# Patient Record
Sex: MALE | Race: WHITE | NOT HISPANIC OR LATINO | Employment: FULL TIME | URBAN - METROPOLITAN AREA
[De-identification: names, ages, dates, MRNs, and addresses within clinical notes are randomized per-mention and may not be internally consistent; named-entity substitution may affect disease eponyms.]

---

## 2018-09-21 ENCOUNTER — HOSPITAL ENCOUNTER (EMERGENCY)
Facility: HOSPITAL | Age: 18
Discharge: HOME | End: 2018-09-21
Attending: EMERGENCY MEDICINE | Admitting: EMERGENCY MEDICINE

## 2018-09-21 VITALS
TEMPERATURE: 99.4 F | BODY MASS INDEX: 39.76 KG/M2 | HEART RATE: 80 BPM | RESPIRATION RATE: 16 BRPM | OXYGEN SATURATION: 98 % | DIASTOLIC BLOOD PRESSURE: 70 MMHG | WEIGHT: 284 LBS | SYSTOLIC BLOOD PRESSURE: 128 MMHG | HEIGHT: 71 IN

## 2018-09-21 DIAGNOSIS — H10.31 ACUTE CONJUNCTIVITIS OF RIGHT EYE, UNSPECIFIED ACUTE CONJUNCTIVITIS TYPE: ICD-10-CM

## 2018-09-21 DIAGNOSIS — J06.9 UPPER RESPIRATORY TRACT INFECTION, UNSPECIFIED TYPE: Primary | ICD-10-CM

## 2018-09-21 LAB
ANION GAP SERPL CALCULATED.3IONS-SCNC: 12.3 MMOL/L
B PERT DNA SPEC QL NAA+PROBE: NOT DETECTED
BASOPHILS # BLD AUTO: 0.02 10*3/MM3 (ref 0–0.2)
BASOPHILS NFR BLD AUTO: 0.2 % (ref 0–1.5)
BUN BLD-MCNC: 10 MG/DL (ref 6–20)
BUN/CREAT SERPL: 11.6 (ref 7–25)
C PNEUM DNA NPH QL NAA+NON-PROBE: NOT DETECTED
CALCIUM SPEC-SCNC: 9.2 MG/DL (ref 8.6–10.5)
CHLORIDE SERPL-SCNC: 104 MMOL/L (ref 98–107)
CO2 SERPL-SCNC: 24.7 MMOL/L (ref 22–29)
CREAT BLD-MCNC: 0.86 MG/DL (ref 0.76–1.27)
DEPRECATED RDW RBC AUTO: 44.3 FL (ref 37–54)
EOSINOPHIL # BLD AUTO: 0.09 10*3/MM3 (ref 0–0.7)
EOSINOPHIL NFR BLD AUTO: 0.7 % (ref 0.3–6.2)
ERYTHROCYTE [DISTWIDTH] IN BLOOD BY AUTOMATED COUNT: 13.8 % (ref 11.5–14.5)
FLUAV H1 2009 PAND RNA NPH QL NAA+PROBE: NOT DETECTED
FLUAV H1 HA GENE NPH QL NAA+PROBE: NOT DETECTED
FLUAV H3 RNA NPH QL NAA+PROBE: NOT DETECTED
FLUAV SUBTYP SPEC NAA+PROBE: NOT DETECTED
FLUBV RNA ISLT QL NAA+PROBE: NOT DETECTED
GFR SERPL CREATININE-BSD FRML MDRD: 115 ML/MIN/1.73
GLUCOSE BLD-MCNC: 88 MG/DL (ref 65–99)
HADV DNA SPEC NAA+PROBE: NOT DETECTED
HCOV 229E RNA SPEC QL NAA+PROBE: NOT DETECTED
HCOV HKU1 RNA SPEC QL NAA+PROBE: NOT DETECTED
HCOV NL63 RNA SPEC QL NAA+PROBE: NOT DETECTED
HCOV OC43 RNA SPEC QL NAA+PROBE: NOT DETECTED
HCT VFR BLD AUTO: 42.2 % (ref 40.4–52.2)
HGB BLD-MCNC: 14.1 G/DL (ref 13.7–17.6)
HMPV RNA NPH QL NAA+NON-PROBE: NOT DETECTED
HOLD SPECIMEN: NORMAL
HOLD SPECIMEN: NORMAL
HPIV1 RNA SPEC QL NAA+PROBE: NOT DETECTED
HPIV2 RNA SPEC QL NAA+PROBE: NOT DETECTED
HPIV3 RNA NPH QL NAA+PROBE: NOT DETECTED
HPIV4 P GENE NPH QL NAA+PROBE: NOT DETECTED
IMM GRANULOCYTES # BLD: 0.03 10*3/MM3 (ref 0–0.03)
IMM GRANULOCYTES NFR BLD: 0.2 % (ref 0–0.5)
LYMPHOCYTES # BLD AUTO: 2.44 10*3/MM3 (ref 0.9–4.8)
LYMPHOCYTES NFR BLD AUTO: 19 % (ref 19.6–45.3)
M PNEUMO IGG SER IA-ACNC: NOT DETECTED
MCH RBC QN AUTO: 29.1 PG (ref 27–32.7)
MCHC RBC AUTO-ENTMCNC: 33.4 G/DL (ref 32.6–36.4)
MCV RBC AUTO: 87 FL (ref 79.8–96.2)
MONOCYTES # BLD AUTO: 0.9 10*3/MM3 (ref 0.2–1.2)
MONOCYTES NFR BLD AUTO: 7 % (ref 5–12)
NEUTROPHILS # BLD AUTO: 9.4 10*3/MM3 (ref 1.9–8.1)
NEUTROPHILS NFR BLD AUTO: 73.1 % (ref 42.7–76)
PLATELET # BLD AUTO: 268 10*3/MM3 (ref 140–500)
PMV BLD AUTO: 9.9 FL (ref 6–12)
POTASSIUM BLD-SCNC: 3.8 MMOL/L (ref 3.5–5.2)
RBC # BLD AUTO: 4.85 10*6/MM3 (ref 4.6–6)
RHINOVIRUS RNA SPEC NAA+PROBE: NOT DETECTED
RSV RNA NPH QL NAA+NON-PROBE: NOT DETECTED
S PYO AG THROAT QL: NEGATIVE
SODIUM BLD-SCNC: 141 MMOL/L (ref 136–145)
WBC NRBC COR # BLD: 12.85 10*3/MM3 (ref 4.5–10.7)
WHOLE BLOOD HOLD SPECIMEN: NORMAL
WHOLE BLOOD HOLD SPECIMEN: NORMAL

## 2018-09-21 PROCEDURE — 87880 STREP A ASSAY W/OPTIC: CPT | Performed by: NURSE PRACTITIONER

## 2018-09-21 PROCEDURE — 80048 BASIC METABOLIC PNL TOTAL CA: CPT | Performed by: NURSE PRACTITIONER

## 2018-09-21 PROCEDURE — 87798 DETECT AGENT NOS DNA AMP: CPT | Performed by: NURSE PRACTITIONER

## 2018-09-21 PROCEDURE — 87081 CULTURE SCREEN ONLY: CPT | Performed by: NURSE PRACTITIONER

## 2018-09-21 PROCEDURE — 87633 RESP VIRUS 12-25 TARGETS: CPT | Performed by: NURSE PRACTITIONER

## 2018-09-21 PROCEDURE — 87486 CHLMYD PNEUM DNA AMP PROBE: CPT | Performed by: NURSE PRACTITIONER

## 2018-09-21 PROCEDURE — 85025 COMPLETE CBC W/AUTO DIFF WBC: CPT | Performed by: NURSE PRACTITIONER

## 2018-09-21 PROCEDURE — 99283 EMERGENCY DEPT VISIT LOW MDM: CPT

## 2018-09-21 PROCEDURE — 36415 COLL VENOUS BLD VENIPUNCTURE: CPT

## 2018-09-21 PROCEDURE — 87581 M.PNEUMON DNA AMP PROBE: CPT | Performed by: NURSE PRACTITIONER

## 2018-09-21 RX ORDER — BACITRACIN ZINC AND POLYMYXIN B SULFATE 500; 10000 [USP'U]/G; [USP'U]/G
OINTMENT OPHTHALMIC 3 TIMES DAILY
Qty: 3.5 G | Refills: 0 | Status: SHIPPED | OUTPATIENT
Start: 2018-09-21 | End: 2022-03-24

## 2018-09-21 NOTE — ED TRIAGE NOTES
Patient has been very fatigued with a sore throat, also has a headache. Patient states that there is a lot of pressure in his head and the headache is all over, and not localized. Patient also has eye drainage.

## 2018-09-21 NOTE — ED PROVIDER NOTES
EMERGENCY DEPARTMENT ENCOUNTER    CHIEF COMPLAINT  Chief Complaint: Sore throat  History given by: Patient  History limited by: None  Room Number: 30/30  PMD: Provider, No Known      HPI:  Pt is a 17 y.o. male who presents complaining of sore throat since 2 days ago. Pt also c/o chills, R eye swelling and drainage, and head pressure that is diffuse, but not localized.     Duration: Since 2 days ago  Onset: Gradual  Timing: Constant  Location: HENT  Intensity/Severity: Moderate  Progression: Unchanged  Associated Symptoms: Chills, R eye swelling and drainage, and head pressure that is diffuse and not localized  Previous Episodes: None  Treatment before arrival: None    PAST MEDICAL HISTORY  Active Ambulatory Problems     Diagnosis Date Noted   • No Active Ambulatory Problems     Resolved Ambulatory Problems     Diagnosis Date Noted   • No Resolved Ambulatory Problems     Past Medical History:   Diagnosis Date   • Asthma    • Sleep apnea        PAST SURGICAL HISTORY  History reviewed. No pertinent surgical history.    FAMILY HISTORY  History reviewed. No pertinent family history.    SOCIAL HISTORY  Social History     Social History   • Marital status: Single     Spouse name: N/A   • Number of children: N/A   • Years of education: N/A     Occupational History   • Not on file.     Social History Main Topics   • Smoking status: Never Smoker   • Smokeless tobacco: Not on file   • Alcohol use No   • Drug use: No   • Sexual activity: Not on file     Other Topics Concern   • Not on file     Social History Narrative   • No narrative on file       ALLERGIES  Patient has no known allergies.    REVIEW OF SYSTEMS  Review of Systems   Constitutional: Positive for chills. Negative for activity change, appetite change and fever.   HENT: Positive for sore throat. Negative for congestion.    Eyes: Positive for discharge (with swelling).   Respiratory: Negative for cough and shortness of breath.    Cardiovascular: Negative for chest  pain and leg swelling.   Gastrointestinal: Negative for abdominal pain, diarrhea and vomiting.   Endocrine: Negative.    Genitourinary: Negative for decreased urine volume and dysuria.   Musculoskeletal: Negative for neck pain.   Skin: Negative for rash and wound.   Allergic/Immunologic: Negative.    Neurological: Positive for headaches (head pressure that is diffuse, but not localized). Negative for weakness and numbness.   Hematological: Negative.    Psychiatric/Behavioral: Negative.    All other systems reviewed and are negative.      PHYSICAL EXAM  ED Triage Vitals   Temp Heart Rate Resp BP SpO2   09/21/18 1444 09/21/18 1444 09/21/18 1552 09/21/18 1552 09/21/18 1444   99.1 °F (37.3 °C) 98 18 124/72 100 %      Temp src Heart Rate Source Patient Position BP Location FiO2 (%)   -- -- -- -- --              Physical Exam   Constitutional: He is oriented to person, place, and time. No distress.   HENT:   Head: Normocephalic and atraumatic.   Right Ear: A middle ear effusion (minimal) is present.   Left Ear: A middle ear effusion (minimal) is present.   Mouth/Throat: Posterior oropharyngeal erythema present.   There is no erythema to ears.   Eyes: Pupils are equal, round, and reactive to light. EOM are normal. Right eye exhibits exudate (yellowish exudate).   R eye has conjunctival erythema.   Neck: Normal range of motion. Neck supple.   Cardiovascular: Normal rate, regular rhythm and normal heart sounds.    Pulmonary/Chest: Effort normal and breath sounds normal. No respiratory distress.   Abdominal: Soft. There is no tenderness. There is no rebound and no guarding.   Musculoskeletal: Normal range of motion. He exhibits no edema.   Lymphadenopathy:     He has no cervical adenopathy.   Neurological: He is alert and oriented to person, place, and time. He has normal sensation and normal strength.   Skin: Skin is warm and dry.   Psychiatric: Mood and affect normal.   Nursing note and vitals reviewed.      LAB  RESULTS  Lab Results (last 24 hours)     Procedure Component Value Units Date/Time    CBC & Differential [[id number]] Collected:  09/21/18 1625    Specimen:  Blood Updated:  09/21/18 1649    Narrative:       The following orders were created for panel order CBC & Differential.  Procedure                               Abnormality         Status                     ---------                               -----------         ------                     CBC Auto Differential[[id number]]        Abnormal            Final result                 Please view results for these tests on the individual orders.    Basic Metabolic Panel [[id number]] Collected:  09/21/18 1625    Specimen:  Blood Updated:  09/21/18 1700     Glucose 88 mg/dL      BUN 10 mg/dL      Creatinine 0.86 mg/dL      Sodium 141 mmol/L      Potassium 3.8 mmol/L      Chloride 104 mmol/L      CO2 24.7 mmol/L      Calcium 9.2 mg/dL      eGFR Non African Amer 115 mL/min/1.73      BUN/Creatinine Ratio 11.6     Anion Gap 12.3 mmol/L     Narrative:       GFR Normal >60  Chronic Kidney Disease <60  Kidney Failure <15    CBC Auto Differential [[id number]]  (Abnormal) Collected:  09/21/18 1625    Specimen:  Blood Updated:  09/21/18 1649     WBC 12.85 (H) 10*3/mm3      RBC 4.85 10*6/mm3      Hemoglobin 14.1 g/dL      Hematocrit 42.2 %      MCV 87.0 fL      MCH 29.1 pg      MCHC 33.4 g/dL      RDW 13.8 %      RDW-SD 44.3 fl      MPV 9.9 fL      Platelets 268 10*3/mm3      Neutrophil % 73.1 %      Lymphocyte % 19.0 (L) %      Monocyte % 7.0 %      Eosinophil % 0.7 %      Basophil % 0.2 %      Immature Grans % 0.2 %      Neutrophils, Absolute 9.40 (H) 10*3/mm3      Lymphocytes, Absolute 2.44 10*3/mm3      Monocytes, Absolute 0.90 10*3/mm3      Eosinophils, Absolute 0.09 10*3/mm3      Basophils, Absolute 0.02 10*3/mm3      Immature Grans, Absolute 0.03 10*3/mm3     Rapid Strep A Screen - Swab, Throat [[id number]]  (Normal) Collected:  09/21/18 1626    Specimen:  Swab from Throat  Updated:  09/21/18 1642     Strep A Ag Negative    Respiratory Panel, PCR - Swab, Nasopharynx [[id number]] Collected:  09/21/18 1626    Specimen:  Swab from Nasopharynx Updated:  09/21/18 1631    Beta Strep Culture, Throat - Swab, Throat [[id number]] Collected:  09/21/18 1626    Specimen:  Swab from Throat Updated:  09/21/18 1641          I ordered the above labs and reviewed the results    PROCEDURES  Procedures      PROGRESS AND CONSULTS  ED Course as of Sep 21 1759   Fri Sep 21, 2018   1554 Sore throat, body wide pain, low grade fever, eye drainage  [EP]      ED Course User Index  [EP] Alicia Stearns, APRN   1726 Discussed with pt at time of exam that his strep test was negative and his sxs are likely a viral illness and URI. Plan to discharge pt with eye drops. Pt can use warm compress on eyes to reduce drainage. Pt understands and agrees with the plan, all questions answered.    MEDICAL DECISION MAKING  Results were reviewed/discussed with the patient and they were also made aware of online access. Pt also made aware that some labs, such as cultures, will not be resulted during ER visit and follow up with PMD is necessary.     MDM  Number of Diagnoses or Management Options  Acute conjunctivitis of right eye, unspecified acute conjunctivitis type:   Upper respiratory tract infection, unspecified type:      Amount and/or Complexity of Data Reviewed  Clinical lab tests: reviewed (WBC= 12.85, Strep A swab is negative)  Decide to obtain previous medical records or to obtain history from someone other than the patient: yes    Patient Progress  Patient progress: stable         DIAGNOSIS  Final diagnoses:   Upper respiratory tract infection, unspecified type   Acute conjunctivitis of right eye, unspecified acute conjunctivitis type       DISPOSITION  DISCHARGE    Patient discharged in stable condition.    Reviewed implications of results, diagnosis, meds, responsibility to follow up, warning signs and symptoms of  possible worsening, potential complications and reasons to return to ER, including new or worsening sxs.    Patient/Family voiced understanding of above instructions.    Discussed plan for discharge, as there is no emergent indication for admission. Patient referred to primary care provider for BP management due to today's BP. Pt/family is agreeable and understands need for follow up and repeat testing.  Pt is aware that discharge does not mean that nothing is wrong but it indicates no emergency is present that requires admission and they must continue care with follow-up as given below or physician of their choice.     FOLLOW-UP  PATIENT LIAISON [facility] [address] [phone]  Schedule an appointment as soon as possible for a visit   As needed    [facility] Emergency Department  [address] [facility] [address] [phone]    If symptoms worsen         Medication List      New Prescriptions    bacitracin-polymyxin b [id number] UNIT/GM ophthalmic ointment  Commonly known as:  POLYSPORIN  Administer  to the right eye 3 (Three) Times a Day.          Latest Documented Vital Signs:  As of 5:59 PM  BP- 127/71 HR- 87 Temp- 99.1 °F (37.3 °C) O2 sat- 98%    --  Documentation assistance provided by sal Barbour for Dr. Gaspar.  Information recorded by the scribe was done at my direction and has been verified and validated by me.     Miriam Barbour  09/21/18 5980       Logan Gaspar MD  09/21/18 8329

## 2018-09-23 LAB — BACTERIA SPEC AEROBE CULT: NORMAL

## 2022-03-24 ENCOUNTER — OFFICE VISIT (OUTPATIENT)
Dept: FAMILY MEDICINE CLINIC | Facility: CLINIC | Age: 22
End: 2022-03-24

## 2022-03-24 VITALS
WEIGHT: 315 LBS | RESPIRATION RATE: 16 BRPM | TEMPERATURE: 97.8 F | DIASTOLIC BLOOD PRESSURE: 89 MMHG | HEART RATE: 85 BPM | BODY MASS INDEX: 45.1 KG/M2 | HEIGHT: 70 IN | SYSTOLIC BLOOD PRESSURE: 133 MMHG | OXYGEN SATURATION: 99 %

## 2022-03-24 DIAGNOSIS — F41.9 ANXIETY: ICD-10-CM

## 2022-03-24 DIAGNOSIS — F32.A DEPRESSION, UNSPECIFIED DEPRESSION TYPE: Primary | ICD-10-CM

## 2022-03-24 PROCEDURE — 99204 OFFICE O/P NEW MOD 45 MIN: CPT

## 2022-03-24 NOTE — PROGRESS NOTES
"Chief Complaint  Anxiety and Depression    Subjective          Hemal Al presents to [facility]    History of Present Illness    Hemal Al 21 y.o. male who presents today for a new patient appointment.  He has a history of:  Patient Active Problem List   Diagnosis   • Asthma   He is here to establish care and is a new patient to me I reviewed the PFSH recorded today by my MA/LPN staff.  He has been feeling depressed and anxious.    He is also here for a new evaluation and treatment of Depression and Anxiety.  He reports depressed mood, difficulty falling asleep, anxiety, impaired concentration, excessive worry, sleep disturbance, decressed appetite and weight loss. Onset of symptoms was approximately 12 years ago. He denies current suicidal and homicidal ideation. Risk factors are family history of anxiety and or depression and family situation/stress.  Previous treatment includes none. He complains of the following medication side effects:none. The patient has previously been in counseling and is not sure if he wants to start counseling again.    He ran away from home at the age of 11-12 years due to not having family support. His mother was an EMT and worked long hours. He moved in with his friend. He was comfortable at his friend's house because there was a good family environment there. He reports this is when his symptoms started, and he has experienced anxiety and depression since then, He has never sought treatment.  He is interested in starting anti-depressant/anti-anxiety medication today.  He denies suicidal thoughts, suicidal plan, and self harm.    PHQ-9 score: 22    He  denies medication side effects.    Objective     Vital Signs:   /89   Pulse 85   Temp 97.8 °F (36.6 °C)   Resp 16   Ht 177.8 cm (70\")   Wt (!) 147 kg (325 lb)   SpO2 99%   BMI 46.63 kg/m²      Review of Systems   Constitutional: Positive for appetite change (decreased).   Respiratory: " Negative for chest tightness and shortness of breath.    Cardiovascular: Negative for chest pain and palpitations.   Gastrointestinal: Negative for abdominal pain, diarrhea, nausea and vomiting.   Psychiatric/Behavioral: Positive for decreased concentration, sleep disturbance, depressed mood and stress. Negative for agitation, behavioral problems, self-injury, suicidal ideas and negative for hyperactivity. The patient is nervous/anxious.          Physical Exam  Vitals and nursing note reviewed.   Constitutional:       General: He is not in acute distress.     Appearance: Normal appearance. He is well-developed. He is morbidly obese. He is not ill-appearing or diaphoretic.   HENT:      Head: Normocephalic and atraumatic.      Right Ear: External ear normal.      Left Ear: External ear normal.   Eyes:      Conjunctiva/sclera: Conjunctivae normal.      Pupils: Pupils are equal, round, and reactive to light.   Cardiovascular:      Rate and Rhythm: Normal rate and regular rhythm.      Heart sounds: Normal heart sounds. No murmur heard.    No gallop.   Pulmonary:      Effort: Pulmonary effort is normal. No respiratory distress.   Musculoskeletal:         General: Normal range of motion.      Cervical back: Normal range of motion and neck supple.   Skin:     General: Skin is warm and dry.      Coloration: Skin is not jaundiced.   Neurological:      General: No focal deficit present.      Mental Status: He is alert and oriented to person, place, and time.      Motor: No abnormal muscle tone.      Coordination: Coordination normal.   Psychiatric:         Attention and Perception: Attention normal.         Mood and Affect: Mood is depressed. Mood is not anxious. Affect is not tearful.         Speech: Speech normal.         Behavior: Behavior normal. Behavior is cooperative.         Thought Content: Thought content normal. Thought content does not include suicidal ideation. Thought content does not include suicidal plan.          Cognition and Memory: Cognition normal.         Judgment: Judgment normal.                     Assessment and Plan      Diagnoses and all orders for this visit:    1. Depression, unspecified depression type (Primary)  Comments:  Initial encounter  Start Sertraline 50 mg  Labs today  Close follow-up in 6 weeks  Orders:  -     sertraline (Zoloft) 50 MG tablet; Take 1 tablet by mouth Daily.  Dispense: 30 tablet; Refill: 1  -     Comprehensive metabolic panel  -     Lipid panel  -     CBC and Differential  -     TSH    2. Anxiety  Comments:  Initial encounter  Start Sertraline 50 mg  Labs today  Close follow-up in 6 weeks  Orders:  -     sertraline (Zoloft) 50 MG tablet; Take 1 tablet by mouth Daily.  Dispense: 30 tablet; Refill: 1  -     Comprehensive metabolic panel  -     Lipid panel  -     CBC and Differential  -     TSH            Follow Up     Return in about 6 weeks (around 5/5/2022) for Next scheduled follow up.    Patient was given instructions and counseling regarding his condition or for health maintenance advice. Please see specific information pulled into the AVS if appropriate.     -Bonds for safety were provided today. The patient was encouraged to seek immediate mental health evaluation at [facility] emergency psychiatric services for worsening depression, suicidal thoughts, suicidal plan, or thoughts of self-harm.   -The patient was given a list of local therapists today.

## 2022-03-25 LAB
ALBUMIN SERPL-MCNC: 4.4 G/DL (ref 4.1–5.2)
ALBUMIN/GLOB SERPL: 1.5 {RATIO} (ref 1.2–2.2)
ALP SERPL-CCNC: 109 IU/L (ref 44–121)
ALT SERPL-CCNC: 16 IU/L (ref 0–44)
AST SERPL-CCNC: 18 IU/L (ref 0–40)
BASOPHILS # BLD AUTO: 0.1 X10E3/UL (ref 0–0.2)
BASOPHILS NFR BLD AUTO: 1 %
BILIRUB SERPL-MCNC: 0.4 MG/DL (ref 0–1.2)
BUN SERPL-MCNC: 11 MG/DL (ref 6–20)
BUN/CREAT SERPL: 12 (ref 9–20)
CALCIUM SERPL-MCNC: 9.3 MG/DL (ref 8.7–10.2)
CHLORIDE SERPL-SCNC: 100 MMOL/L (ref 96–106)
CHOLEST SERPL-MCNC: 152 MG/DL (ref 100–199)
CO2 SERPL-SCNC: 22 MMOL/L (ref 20–29)
CREAT SERPL-MCNC: 0.93 MG/DL (ref 0.76–1.27)
EGFRCR SERPLBLD CKD-EPI 2021: 118 ML/MIN/1.73
EOSINOPHIL # BLD AUTO: 0.1 X10E3/UL (ref 0–0.4)
EOSINOPHIL NFR BLD AUTO: 1 %
ERYTHROCYTE [DISTWIDTH] IN BLOOD BY AUTOMATED COUNT: 13.3 % (ref 11.6–15.4)
GLOBULIN SER CALC-MCNC: 2.9 G/DL (ref 1.5–4.5)
GLUCOSE SERPL-MCNC: 83 MG/DL (ref 65–99)
HCT VFR BLD AUTO: 45.6 % (ref 37.5–51)
HDLC SERPL-MCNC: 37 MG/DL
HGB BLD-MCNC: 15.5 G/DL (ref 13–17.7)
IMM GRANULOCYTES # BLD AUTO: 0 X10E3/UL (ref 0–0.1)
IMM GRANULOCYTES NFR BLD AUTO: 0 %
LDLC SERPL CALC-MCNC: 101 MG/DL (ref 0–99)
LYMPHOCYTES # BLD AUTO: 2.1 X10E3/UL (ref 0.7–3.1)
LYMPHOCYTES NFR BLD AUTO: 20 %
MCH RBC QN AUTO: 29.1 PG (ref 26.6–33)
MCHC RBC AUTO-ENTMCNC: 34 G/DL (ref 31.5–35.7)
MCV RBC AUTO: 86 FL (ref 79–97)
MONOCYTES # BLD AUTO: 0.7 X10E3/UL (ref 0.1–0.9)
MONOCYTES NFR BLD AUTO: 7 %
NEUTROPHILS # BLD AUTO: 7.4 X10E3/UL (ref 1.4–7)
NEUTROPHILS NFR BLD AUTO: 71 %
PLATELET # BLD AUTO: 321 X10E3/UL (ref 150–450)
POTASSIUM SERPL-SCNC: 4.6 MMOL/L (ref 3.5–5.2)
PROT SERPL-MCNC: 7.3 G/DL (ref 6–8.5)
RBC # BLD AUTO: 5.33 X10E6/UL (ref 4.14–5.8)
SODIUM SERPL-SCNC: 141 MMOL/L (ref 134–144)
TRIGL SERPL-MCNC: 69 MG/DL (ref 0–149)
TSH SERPL DL<=0.005 MIU/L-ACNC: 2.71 UIU/ML (ref 0.45–4.5)
VLDLC SERPL CALC-MCNC: 14 MG/DL (ref 5–40)
WBC # BLD AUTO: 10.5 X10E3/UL (ref 3.4–10.8)

## 2022-05-12 DIAGNOSIS — F32.A DEPRESSION, UNSPECIFIED DEPRESSION TYPE: ICD-10-CM

## 2022-05-12 DIAGNOSIS — F41.9 ANXIETY: ICD-10-CM

## 2022-05-12 NOTE — TELEPHONE ENCOUNTER
Caller: Hemal Al    Relationship: Self    Best call back number: [phone]      Requested Prescriptions:   Requested Prescriptions     Pending Prescriptions Disp Refills   • sertraline (Zoloft) 50 MG tablet 30 tablet 1     Sig: Take 1 tablet by mouth Daily.        Pharmacy where request should be sent: [id number] - [place], [place] - [address] [facility] [phone] [facility] [phone] FX     Additional details provided by patient: HAS LESS THAN 3 DAY SUPPLY.    Does the patient have less than a 3 day supply:  [x] Yes  [] No    Angelica Razo, PCT   05/12/22 12:45 EDT

## 2022-05-17 ENCOUNTER — OFFICE VISIT (OUTPATIENT)
Dept: FAMILY MEDICINE CLINIC | Facility: CLINIC | Age: 22
End: 2022-05-17

## 2022-05-17 VITALS
HEIGHT: 70 IN | TEMPERATURE: 97.3 F | DIASTOLIC BLOOD PRESSURE: 75 MMHG | BODY MASS INDEX: 45.1 KG/M2 | WEIGHT: 315 LBS | OXYGEN SATURATION: 98 % | RESPIRATION RATE: 16 BRPM | SYSTOLIC BLOOD PRESSURE: 119 MMHG | HEART RATE: 81 BPM

## 2022-05-17 DIAGNOSIS — R51.9 NONINTRACTABLE HEADACHE, UNSPECIFIED CHRONICITY PATTERN, UNSPECIFIED HEADACHE TYPE: ICD-10-CM

## 2022-05-17 DIAGNOSIS — F32.A DEPRESSION, UNSPECIFIED DEPRESSION TYPE: Primary | ICD-10-CM

## 2022-05-17 DIAGNOSIS — F41.9 ANXIETY: ICD-10-CM

## 2022-05-17 DIAGNOSIS — H65.192 ACUTE MEE (MIDDLE EAR EFFUSION), LEFT: ICD-10-CM

## 2022-05-17 DIAGNOSIS — R43.0 LOSS OF SMELL: ICD-10-CM

## 2022-05-17 DIAGNOSIS — R43.2 LOSS OF TASTE: ICD-10-CM

## 2022-05-17 LAB
EXPIRATION DATE: NORMAL
FLUAV AG UPPER RESP QL IA.RAPID: NOT DETECTED
FLUBV AG UPPER RESP QL IA.RAPID: NOT DETECTED
INTERNAL CONTROL: NORMAL
Lab: NORMAL
SARS-COV-2 AG UPPER RESP QL IA.RAPID: NOT DETECTED

## 2022-05-17 PROCEDURE — 99214 OFFICE O/P EST MOD 30 MIN: CPT

## 2022-05-17 PROCEDURE — 87428 SARSCOV & INF VIR A&B AG IA: CPT

## 2022-05-17 RX ORDER — FLUTICASONE PROPIONATE 50 MCG
2 SPRAY, SUSPENSION (ML) NASAL DAILY
Qty: 16 G | Refills: 1 | Status: SHIPPED | OUTPATIENT
Start: 2022-05-17 | End: 2022-08-08

## 2022-05-17 RX ORDER — SERTRALINE HYDROCHLORIDE 100 MG/1
100 TABLET, FILM COATED ORAL DAILY
Qty: 30 TABLET | Refills: 1 | Status: SHIPPED | OUTPATIENT
Start: 2022-05-17 | End: 2022-08-08

## 2022-05-17 NOTE — PROGRESS NOTES
"Chief Complaint  Depression    Subjective          Hemal Al presents to [facility]    History of Present Illness    Hemal Al 21 y.o. male who presents today for a follow-up on depression and anxiety.  The patient was last seen on 3/24/2022.  The patient was started on sertraline 50 mg once daily at that appointment.  Patient was given a list of local therapist at that appointment and encouraged to make an appointment.    His uncle passed away on Sunday, and his brother passed away yesterday. The patient ran away from home at the age of 11-12 years due to not having family support. His mother was an EMT and worked long hours. He moved in with his friend. He was comfortable at his friend's house because there was a good family environment there. He reports this is when his symptoms started, and he has experienced anxiety and depression since then.  He denies suicidal thoughts, suicidal plan, and self harm.    PHQ-9 score: 15.  His score was 22 at his last appointment.     He reports a headache, decreased hearing and pressure of the left ear, and loss of taste and smell. Symptom onset was Friday. Evaluation to date: none. Treatment to date: Benadryl. No fever, chills, body aches, chest pain, shortness of breath, palpitations, dizziness, lightheadedness, or weakness.     He  denies medication side effects.    The following data was reviewed by: KAREN Penaloza on 05/17/2022:  POCT SARS-CoV-2 Antigen LINSEY + Flu (05/17/2022 16:54)    Objective     Vital Signs:   /75   Pulse 81   Temp 97.3 °F (36.3 °C)   Resp 16   Ht 177.8 cm (70\")   Wt (!) 147 kg (325 lb)   SpO2 98%   BMI 46.63 kg/m²      Review of Systems   Constitutional: Negative for appetite change, chills, diaphoresis and fever.   HENT: Positive for hearing loss (decreased hearing and pressure of the left ear). Negative for congestion, ear discharge, ear pain, nosebleeds, postnasal drip, rhinorrhea, sinus " pressure, sneezing, sore throat, swollen glands, trouble swallowing and voice change.    Respiratory: Negative for cough, chest tightness, shortness of breath and wheezing.    Cardiovascular: Negative for chest pain and palpitations.   Gastrointestinal: Negative for abdominal pain, diarrhea, nausea and vomiting.   Genitourinary: Negative for dysuria and flank pain.   Musculoskeletal: Negative for back pain.   Skin: Negative for rash.   Neurological: Positive for headache.   Psychiatric/Behavioral: Positive for sleep disturbance, depressed mood (improved but still having symptoms) and stress. Negative for agitation, behavioral problems, decreased concentration, self-injury, suicidal ideas and negative for hyperactivity. The patient is nervous/anxious.          Physical Exam  Vitals and nursing note reviewed.   Constitutional:       General: He is not in acute distress.     Appearance: Normal appearance. He is well-developed. He is not ill-appearing, toxic-appearing or diaphoretic.   HENT:      Head: Normocephalic and atraumatic. Hair is normal.      Right Ear: Hearing and external ear normal. No drainage, swelling or tenderness. No middle ear effusion. Tympanic membrane is not injected, erythematous or retracted.      Left Ear: Hearing and external ear normal. No decreased hearing noted. No drainage, swelling or tenderness. A middle ear effusion is present. Tympanic membrane is not injected, erythematous or retracted.      Nose: No mucosal edema, congestion or rhinorrhea.      Mouth/Throat:      Mouth: Mucous membranes are moist. No oral lesions.      Pharynx: Uvula midline. No pharyngeal swelling, oropharyngeal exudate, posterior oropharyngeal erythema or uvula swelling.      Tonsils: No tonsillar exudate.   Eyes:      General: No scleral icterus.        Right eye: No discharge.         Left eye: No discharge.      Conjunctiva/sclera: Conjunctivae normal.      Pupils: Pupils are equal, round, and reactive to light.    Cardiovascular:      Rate and Rhythm: Normal rate and regular rhythm.      Heart sounds: Normal heart sounds. No murmur heard.    No gallop.   Pulmonary:      Effort: No accessory muscle usage, prolonged expiration or respiratory distress.      Breath sounds: Normal breath sounds. No stridor or decreased air movement. No decreased breath sounds, wheezing, rhonchi or rales.   Chest:      Chest wall: No tenderness.   Abdominal:      Palpations: Abdomen is soft.      Tenderness: There is no abdominal tenderness.   Musculoskeletal:      Cervical back: Normal range of motion and neck supple.   Lymphadenopathy:      Cervical: No cervical adenopathy.   Skin:     General: Skin is warm and dry.      Findings: No rash.   Neurological:      Mental Status: He is alert and oriented to person, place, and time.      Motor: No abnormal muscle tone.   Psychiatric:         Attention and Perception: Attention normal.         Mood and Affect: Mood is depressed. Mood is not anxious. Affect is not tearful.         Speech: Speech normal.         Behavior: Behavior normal.         Thought Content: Thought content normal. Thought content does not include suicidal ideation. Thought content does not include suicidal plan.         Cognition and Memory: Cognition normal.         Judgment: Judgment normal.                     Assessment and Plan      Diagnoses and all orders for this visit:    1. Depression, unspecified depression type (Primary)  Comments:  Improved but still having symptoms  Increase Sertraline to 100 mg  Make appt. for therapy  Close follow-up in 6 weeks  Orders:  -     sertraline (ZOLOFT) 100 MG tablet; Take 1 tablet by mouth Daily for 60 days.  Dispense: 30 tablet; Refill: 1    2. Anxiety  Comments:  Improved but still having symptoms  Increase Sertraline to 100 mg  Make appt. for therapy  Close follow-up in 6 weeks  Orders:  -     sertraline (ZOLOFT) 100 MG tablet; Take 1 tablet by mouth Daily for 60 days.  Dispense: 30  tablet; Refill: 1    3. Nonintractable headache, unspecified chronicity pattern, unspecified headache type  Comments:  Initial encounter  Take Tylenol as needed  Covid test negative today  Return if no improvement  Orders:  -     POCT SARS-CoV-2 Antigen LINSEY + Flu    4. Loss of taste  Comments:  Initial encounter  Covid test negative today  Return if no improvement  Orders:  -     POCT SARS-CoV-2 Antigen LINSEY + Flu    5. Loss of smell  Comments:  Initial encounter  Covid test negative today  Return if no improvement  Orders:  -     POCT SARS-CoV-2 Antigen LINSEY + Flu    6. Acute ROSELIA (middle ear effusion), left  Comments:  Initial encounter  Flonase and Zyrtec daily until symptoms resolve  Return if no improvement  Orders:  -     fluticasone (Flonase) 50 MCG/ACT nasal spray; 2 sprays into the nostril(s) as directed by provider Daily.  Dispense: 16 g; Refill: 1            Follow Up     Return in about 6 weeks (around 6/28/2022) for Next scheduled follow up.    Patient was given instructions and counseling regarding his condition or for health maintenance advice. Please see specific information pulled into the AVS if appropriate.     -Bonds for safety were provided today. The patient was encouraged to seek immediate mental health evaluation at [facility] emergency psychiatric services for worsening depression, suicidal thoughts, suicidal plan, or thoughts of self-harm.   -The patient was given a list of local therapists today. He states he will make an appointment.

## 2022-08-08 ENCOUNTER — OFFICE VISIT (OUTPATIENT)
Dept: FAMILY MEDICINE CLINIC | Facility: CLINIC | Age: 22
End: 2022-08-08

## 2022-08-08 VITALS
HEART RATE: 76 BPM | BODY MASS INDEX: 45.1 KG/M2 | OXYGEN SATURATION: 97 % | RESPIRATION RATE: 16 BRPM | WEIGHT: 315 LBS | HEIGHT: 70 IN | TEMPERATURE: 97.7 F | SYSTOLIC BLOOD PRESSURE: 127 MMHG | DIASTOLIC BLOOD PRESSURE: 81 MMHG

## 2022-08-08 DIAGNOSIS — K21.9 GASTROESOPHAGEAL REFLUX DISEASE, UNSPECIFIED WHETHER ESOPHAGITIS PRESENT: Primary | ICD-10-CM

## 2022-08-08 DIAGNOSIS — R53.83 OTHER FATIGUE: ICD-10-CM

## 2022-08-08 PROCEDURE — 99213 OFFICE O/P EST LOW 20 MIN: CPT

## 2022-08-08 RX ORDER — OMEPRAZOLE 40 MG/1
40 CAPSULE, DELAYED RELEASE ORAL DAILY
Qty: 30 CAPSULE | Refills: 1 | Status: SHIPPED | OUTPATIENT
Start: 2022-08-08

## 2022-08-08 NOTE — PROGRESS NOTES
"Chief Complaint  Fatigue and Abdominal Pain    Subjective          History of Present Illness    Hemal Al 21 y.o. male who presents today for reports of upper abdominal cramping, reflux, and fatigue.  The cramping and reflux occurs after meals, and is aggravated by spicy foods, but it can occur with any food.  He admits to eating a large meal late in the evening and then laying down.  Symptom onset was as a child, but worsened around three months ago.  The patient report soft stools 1-2 times per day.  Evaluation to date: none.  Treatment to date: none.    The patient reports fatigue started around 3 weeks ago after he started lifting weights and working out in the gym.  The patient reports fatigue is related to working out and exercising.    He  denies medication side effects.    Review of Systems   Constitutional: Positive for fatigue. Negative for appetite change, chills and fever.   HENT: Negative for congestion, sinus pressure and trouble swallowing.    Eyes: Negative for visual disturbance.   Respiratory: Negative for cough, chest tightness, shortness of breath and wheezing.    Cardiovascular: Negative for chest pain, palpitations and leg swelling.   Gastrointestinal: Positive for abdominal pain (abdominal cramping, epigastric). Negative for abdominal distention, anal bleeding, blood in stool, constipation, diarrhea, nausea, rectal pain and vomiting.        Reflux    Genitourinary: Negative for dysuria, frequency and urgency.   Musculoskeletal: Negative for gait problem, myalgias and neck pain.   Skin: Negative for rash.   Neurological: Negative for dizziness, syncope, weakness and light-headedness.   Psychiatric/Behavioral: Negative for dysphoric mood. The patient is not nervous/anxious.         Objective   Vital Signs:   /81   Pulse 76   Temp 97.7 °F (36.5 °C) (Skin)   Resp 16   Ht 177.8 cm (70\")   Wt (!) 144 kg (318 lb)   SpO2 97%   BMI 45.63 kg/m²      Class 3 Severe Obesity (BMI >=40). " Obesity-related health conditions include the following: obstructive sleep apnea. Obesity is improving with lifestyle modifications. BMI is is above average; BMI management plan is completed. We discussed portion control and increasing exercise.        Physical Exam  Vitals and nursing note reviewed.   Constitutional:       General: He is not in acute distress.     Appearance: He is well-developed. He is not diaphoretic.   HENT:      Head: Normocephalic and atraumatic.   Eyes:      General: No scleral icterus.     Conjunctiva/sclera: Conjunctivae normal.      Pupils: Pupils are equal, round, and reactive to light.   Cardiovascular:      Rate and Rhythm: Normal rate and regular rhythm.      Heart sounds: Normal heart sounds. No murmur heard.    No gallop.   Pulmonary:      Effort: Pulmonary effort is normal. No respiratory distress.      Breath sounds: No wheezing or rales.   Chest:      Chest wall: No tenderness.   Abdominal:      General: Abdomen is protuberant. Bowel sounds are normal. There is no distension or abdominal bruit.      Palpations: Abdomen is soft. Abdomen is not rigid. There is no hepatomegaly, splenomegaly or mass.      Tenderness: There is no abdominal tenderness. There is no guarding or rebound. Negative signs include Gatica's sign and McBurney's sign.      Comments: No abdominal tenderness with palpation.  Abdomen palpates soft.  No guarding or rebound tenderness.   Musculoskeletal:         General: No deformity. Normal range of motion.      Cervical back: Normal range of motion and neck supple.   Skin:     General: Skin is warm and dry.      Findings: No rash.   Neurological:      Mental Status: He is alert and oriented to person, place, and time.   Psychiatric:         Behavior: Behavior normal.         Thought Content: Thought content normal.         Judgment: Judgment normal.                         Assessment and Plan      Diagnoses and all orders for this visit:    1. Gastroesophageal  reflux disease, unspecified whether esophagitis present (Primary)  Comments:  Omeprazole trial as directed  Dietary and lifestyle modifications related to GERD were reviewed, information given  Return if symptoms do not improve  Orders:  -     omeprazole (priLOSEC) 40 MG capsule; Take 1 capsule by mouth Daily.  Dispense: 30 capsule; Refill: 1    2. Other fatigue  Comments:  Related to recent exercise and weightlifting, no other symptoms  Get plenty of rest            Follow Up     Return if symptoms worsen or fail to improve.    Patient was given instructions and counseling regarding his condition or for health maintenance advice. Please see specific information pulled into the AVS if appropriate.     -Return if symptoms do not improve.

## 2024-04-30 ENCOUNTER — OFFICE VISIT (OUTPATIENT)
Dept: FAMILY MEDICINE CLINIC | Facility: CLINIC | Age: 24
End: 2024-04-30

## 2024-04-30 VITALS
OXYGEN SATURATION: 96 % | SYSTOLIC BLOOD PRESSURE: 130 MMHG | BODY MASS INDEX: 45.1 KG/M2 | TEMPERATURE: 98.7 F | HEIGHT: 70 IN | HEART RATE: 90 BPM | DIASTOLIC BLOOD PRESSURE: 70 MMHG | WEIGHT: 315 LBS

## 2024-04-30 DIAGNOSIS — M25.562 ACUTE PAIN OF BOTH KNEES: ICD-10-CM

## 2024-04-30 DIAGNOSIS — K21.9 GASTROESOPHAGEAL REFLUX DISEASE, UNSPECIFIED WHETHER ESOPHAGITIS PRESENT: ICD-10-CM

## 2024-04-30 DIAGNOSIS — M54.50 PAIN IN LEFT LUMBAR REGION OF BACK: Primary | ICD-10-CM

## 2024-04-30 DIAGNOSIS — K59.09 CHRONIC CONSTIPATION: ICD-10-CM

## 2024-04-30 DIAGNOSIS — M25.561 ACUTE PAIN OF BOTH KNEES: ICD-10-CM

## 2024-04-30 PROCEDURE — 99214 OFFICE O/P EST MOD 30 MIN: CPT

## 2024-04-30 RX ORDER — BACLOFEN 10 MG/1
10 TABLET ORAL 3 TIMES DAILY
Qty: 30 TABLET | Refills: 1 | Status: SHIPPED | OUTPATIENT
Start: 2024-04-30

## 2024-04-30 RX ORDER — LIDOCAINE 50 MG/G
1 PATCH TOPICAL EVERY 24 HOURS
Qty: 30 PATCH | Refills: 1 | Status: SHIPPED | OUTPATIENT
Start: 2024-04-30

## 2024-04-30 RX ORDER — OMEPRAZOLE 40 MG/1
40 CAPSULE, DELAYED RELEASE ORAL DAILY
Qty: 30 CAPSULE | Refills: 0 | Status: SHIPPED | OUTPATIENT
Start: 2024-04-30

## 2024-04-30 NOTE — PROGRESS NOTES
Chief Complaint  Back Pain and Knee Pain (Bl knee pain)    Subjective          Back Pain  Pertinent negatives include no abdominal pain, chest pain, dysuria, fever, numbness or weakness.   Knee Pain   Pertinent negatives include no numbness.       Hemal Al 23 y.o. male presents today reporting bilateral knee and lower back pain. The symptoms began  one month ago   Lumbar back pain started 6-7 years ago with no injury.  Bilateral knee pain is a result of falling on a roof onto the right knee one month ago while working.  He had a prior injury to the left knee playing volleyball two years ago.  Pain is located knee and lumbar region. Discomfort is described as aching and throbbing. Symptoms are exacerbated by walking, standing, and getting up to stand.  Evaluation to date: plain films: normal in 2019. Therapy to date includes: rest and ice.    Mr. Al has gastroesophageal reflux disease.  He was prescribed Omeprazole 40 mg at a prior appointment.  The patient admits he did not take the medication.  He does sometimes eat large meals late in the evening and lays down less than 3 hours after the evening meal.  He does eat spicy foods.  He does not drink alcohol.    He also reports chronic constipation.  He does have regular bowel movements, but he does not feel like he completely empties.  He reports associated bloating and abdominal cramping.  No fever, abdominal pain, nausea, vomiting, rectal bleeding, or blood in stool.  He has not tried anything for relief of symptoms.  He admits he could eat more fiber and drink more water.         Review of Systems   Constitutional:  Negative for appetite change, chills, fatigue and fever.   HENT:  Negative for congestion, sinus pressure and trouble swallowing.    Eyes:  Negative for visual disturbance.   Respiratory:  Negative for cough, chest tightness, shortness of breath and wheezing.    Cardiovascular:  Negative for chest pain, palpitations and leg swelling.  "  Gastrointestinal:  Positive for constipation. Negative for abdominal pain, anal bleeding, blood in stool, diarrhea, nausea, rectal pain and vomiting.        Reflux   Genitourinary:  Negative for dysuria, frequency and urgency.   Musculoskeletal:  Positive for arthralgias (bilateral knee) and back pain. Negative for gait problem, joint swelling, myalgias and neck pain.   Skin:  Negative for rash.   Neurological:  Negative for dizziness, syncope, weakness, light-headedness and numbness.   Psychiatric/Behavioral:  Negative for dysphoric mood. The patient is not nervous/anxious.         Objective   Vital Signs:   /70   Pulse 90   Temp 98.7 °F (37.1 °C)   Ht 177.8 cm (70\")   Wt (!) 153 kg (337 lb 6.4 oz)   SpO2 96%   BMI 48.41 kg/m²      Class 3 Severe Obesity (BMI >=40). Obesity-related health conditions include the following: none. Obesity is newly identified. BMI is is above average; BMI management plan is completed. We discussed portion control and increasing exercise.       Physical Exam  Vitals and nursing note reviewed.   Constitutional:       General: He is not in acute distress.     Appearance: He is well-developed. He is obese. He is not ill-appearing or diaphoretic.   HENT:      Head: Normocephalic and atraumatic.   Eyes:      General: No scleral icterus.     Conjunctiva/sclera: Conjunctivae normal.      Pupils: Pupils are equal, round, and reactive to light.   Cardiovascular:      Rate and Rhythm: Normal rate and regular rhythm.      Heart sounds: Normal heart sounds. No murmur heard.     No gallop.   Pulmonary:      Effort: Pulmonary effort is normal. No respiratory distress.      Breath sounds: Normal breath sounds. No wheezing or rales.   Chest:      Chest wall: No tenderness.   Abdominal:      General: Abdomen is protuberant.      Tenderness: There is no abdominal tenderness. There is no right CVA tenderness, left CVA tenderness, guarding or rebound.      Comments: No abdominal tenderness " with palpation.  Abdomen palpates soft.  No guarding or rebound tenderness.   Musculoskeletal:         General: No tenderness or deformity.      Cervical back: Normal range of motion and neck supple.      Lumbar back: No swelling, edema, signs of trauma, spasms, tenderness or bony tenderness. Normal range of motion.      Right knee: No swelling, erythema, lacerations, bony tenderness or crepitus. Normal range of motion. No tenderness. Normal alignment and normal patellar mobility.      Instability Tests: Anterior drawer test negative. Posterior drawer test negative.      Left knee: No swelling, erythema, lacerations, bony tenderness or crepitus. Normal range of motion. No tenderness. Normal alignment and normal patellar mobility.      Instability Tests: Anterior drawer test negative. Posterior drawer test negative.      Comments: No pain with palpation of bilateral knees and no pain with palpation of lumbar area.  Sensation is intact.  Gait is normal.  DTR's are normal and symmetric.   Skin:     General: Skin is warm and dry.      Findings: No rash.   Neurological:      Mental Status: He is alert and oriented to person, place, and time.      Sensory: Sensation is intact. No sensory deficit.      Motor: No weakness, tremor, atrophy or abnormal muscle tone.      Coordination: Coordination normal.      Gait: Gait normal.      Deep Tendon Reflexes: Reflexes are normal and symmetric. Reflexes normal.      Reflex Scores:       Patellar reflexes are 2+ on the right side and 2+ on the left side.       Achilles reflexes are 2+ on the right side and 2+ on the left side.  Psychiatric:         Mood and Affect: Mood normal.          The following data was reviewed by: KAREN Penaloza on 04/30/2024:  XR L Spine Ap & Lateral (11/07/2019 12:18)  XR Knee 3 Vws RT (11/07/2019 12:17)             Assessment and Plan      Assessment & Plan  Pain in left lumbar region of back  X-ray at outside imaging facility  Baclofen, NSAID's,  Lidocaine patches as needed  Return if no improvement  Acute pain of both knees  X-ray at outside imaging facility  Baclofen, NSAID's as needed  Return if no improvement  Gastroesophageal reflux disease, unspecified whether esophagitis present  Omeprazole 4-week trial  GERD diet and lifestyle modifications-info given in MyChart  Return if no improvement  Chronic constipation  OTC Miralax daily until full evacuation of bowels  Increase water intake and dietary fiber-info given in MyChart  Return if no improvement    Orders Placed This Encounter   Procedures    XR Spine Lumbar 4+ View    XR Knee 3 View Bilateral     New Medications Ordered This Visit   Medications    omeprazole (priLOSEC) 40 MG capsule     Sig: Take 1 capsule by mouth Daily.     Dispense:  30 capsule     Refill:  0    baclofen (LIORESAL) 10 MG tablet     Sig: Take 1 tablet by mouth 3 (Three) Times a Day.     Dispense:  30 tablet     Refill:  1    lidocaine (LIDODERM) 5 %     Sig: Place 1 patch on the skin as directed by provider Daily. Remove & Discard patch within 12 hours or as directed by MD     Dispense:  30 patch     Refill:  1              Follow Up     Return if symptoms worsen or fail to improve.    Patient was given instructions and counseling regarding his condition or for health maintenance advice. Please see specific information pulled into the AVS if appropriate.

## 2024-05-01 ENCOUNTER — HOSPITAL ENCOUNTER (OUTPATIENT)
Dept: GENERAL RADIOLOGY | Facility: HOSPITAL | Age: 24
Discharge: HOME | End: 2024-05-01

## 2024-05-01 PROCEDURE — 72110 X-RAY EXAM L-2 SPINE 4/>VWS: CPT

## 2024-05-01 PROCEDURE — 73560 X-RAY EXAM OF KNEE 1 OR 2: CPT

## 2024-05-10 ENCOUNTER — PRIOR AUTHORIZATION (OUTPATIENT)
Dept: FAMILY MEDICINE CLINIC | Facility: CLINIC | Age: 24
End: 2024-05-10
Payer: MEDICAID

## 2024-05-15 DIAGNOSIS — M51.46 SCHMORL'S NODES OF LUMBAR REGION: Primary | ICD-10-CM

## 2024-05-15 DIAGNOSIS — M51.26 LUMBAR DISC HERNIATION: ICD-10-CM

## 2024-05-15 DIAGNOSIS — M54.50 PAIN IN LEFT LUMBAR REGION OF BACK: ICD-10-CM

## 2024-05-22 ENCOUNTER — OFFICE VISIT (OUTPATIENT)
Dept: FAMILY MEDICINE CLINIC | Facility: CLINIC | Age: 24
End: 2024-05-22
Payer: COMMERCIAL

## 2024-05-22 VITALS
HEIGHT: 70 IN | DIASTOLIC BLOOD PRESSURE: 78 MMHG | BODY MASS INDEX: 45.1 KG/M2 | TEMPERATURE: 98.2 F | WEIGHT: 315 LBS | HEART RATE: 79 BPM | OXYGEN SATURATION: 97 % | SYSTOLIC BLOOD PRESSURE: 130 MMHG

## 2024-05-22 DIAGNOSIS — R11.0 NAUSEA: Primary | ICD-10-CM

## 2024-05-22 DIAGNOSIS — K21.9 GASTROESOPHAGEAL REFLUX DISEASE, UNSPECIFIED WHETHER ESOPHAGITIS PRESENT: ICD-10-CM

## 2024-05-22 PROCEDURE — 99213 OFFICE O/P EST LOW 20 MIN: CPT

## 2024-05-22 RX ORDER — ONDANSETRON HYDROCHLORIDE 8 MG/1
8 TABLET, FILM COATED ORAL EVERY 8 HOURS PRN
Qty: 30 TABLET | Refills: 0 | Status: SHIPPED | OUTPATIENT
Start: 2024-05-22

## 2024-05-22 RX ORDER — OMEPRAZOLE 40 MG/1
40 CAPSULE, DELAYED RELEASE ORAL DAILY
Qty: 30 CAPSULE | Refills: 1 | Status: SHIPPED | OUTPATIENT
Start: 2024-05-22

## 2024-05-22 NOTE — PROGRESS NOTES
"Chief Complaint  Nausea    Subjective          Nausea  Associated symptoms include nausea and vomiting (Improved). Pertinent negatives include no abdominal pain, chest pain, chills, congestion, coughing, fatigue, fever, myalgias, neck pain, rash or weakness.       Hemal Al 23 y.o. male presents today reporting the following:    \"Medicine for stomach, woke up today on 5-15-24 and the moment I leaned up I had to puke, thats twice now since clarita been taking the medicine, happened alot more with out it but eulalia whats still causing it. Following the food diet the best i can and i can feel a difference, just wanna get the nausea fixed.\"    The patient was started on Omeprazole 40 mg daily on 4/30/2024 for 4-week trial related to GERD.    He reports nausea and intermittent vomiting in the morning when he wakes up.  He has been compliant with taking Omeprazole daily. He has worked on a GERD diet. He is eating smaller and more frequent meals throughout the day as recommended. He is still laying down less than 3 hours after dinner. However, he states symptoms and vomiting have improved since starting Omeprazole.        Review of Systems   Constitutional:  Negative for appetite change, chills, fatigue and fever.   HENT:  Negative for congestion, sinus pressure and trouble swallowing.    Eyes:  Negative for visual disturbance.   Respiratory:  Negative for cough, chest tightness, shortness of breath and wheezing.    Cardiovascular:  Negative for chest pain, palpitations and leg swelling.   Gastrointestinal:  Positive for nausea and vomiting (Improved). Negative for abdominal pain, blood in stool, constipation, diarrhea and rectal pain.   Genitourinary:  Negative for dysuria, frequency and urgency.   Musculoskeletal:  Negative for gait problem, myalgias and neck pain.   Skin:  Negative for rash.   Neurological:  Negative for dizziness, syncope, weakness and light-headedness.   Psychiatric/Behavioral:  Negative for dysphoric " "mood. The patient is not nervous/anxious.         Objective   Vital Signs:   /78   Pulse 79   Temp 98.2 °F (36.8 °C) (Temporal)   Ht 177.8 cm (70\")   Wt (!) 153 kg (338 lb 3.2 oz)   SpO2 97%   BMI 48.53 kg/m²      Class 3 Severe Obesity (BMI >=40). Obesity-related health conditions include the following: GERD. Obesity is worsening. BMI is is above average; BMI management plan is completed. We discussed portion control and increasing exercise.       Physical Exam  Vitals and nursing note reviewed.   Constitutional:       General: He is not in acute distress.     Appearance: He is well-developed. He is obese. He is not ill-appearing or diaphoretic.   HENT:      Head: Normocephalic and atraumatic.   Eyes:      General: No scleral icterus.     Conjunctiva/sclera: Conjunctivae normal.      Pupils: Pupils are equal, round, and reactive to light.   Cardiovascular:      Rate and Rhythm: Normal rate and regular rhythm.      Heart sounds: Normal heart sounds. No murmur heard.     No gallop.   Pulmonary:      Effort: Pulmonary effort is normal. No respiratory distress.      Breath sounds: No wheezing or rales.   Chest:      Chest wall: No tenderness.   Abdominal:      General: Abdomen is protuberant. Bowel sounds are normal. There is no distension or abdominal bruit.      Palpations: Abdomen is soft. Abdomen is not rigid. There is no hepatomegaly, splenomegaly or mass.      Tenderness: There is no abdominal tenderness. There is no guarding or rebound. Negative signs include Gatica's sign and McBurney's sign.      Comments: No abdominal tenderness with palpation.  Abdomen palpates soft.  No guarding or rebound tenderness.   Musculoskeletal:         General: No deformity. Normal range of motion.      Cervical back: Normal range of motion and neck supple.   Skin:     General: Skin is warm and dry.      Findings: No rash.   Neurological:      Mental Status: He is alert and oriented to person, place, and time. "   Psychiatric:         Mood and Affect: Mood normal.                         Assessment and Plan      Assessment & Plan  Gastroesophageal reflux disease, unspecified whether esophagitis present  GERD diet, lifestyle modifications-reviewed today and info given in MyChart  Repeat Omeprazole 4-week trial  Zofran as needed for nausea  Hydrate well  Return if symptoms worsen or do not improve  Nausea  Zofran as needed for nausea  Hydrate well  Return if symptoms worsen or do not improve     New Medications Ordered This Visit   Medications    omeprazole (priLOSEC) 40 MG capsule     Sig: Take 1 capsule by mouth Daily.     Dispense:  30 capsule     Refill:  1    ondansetron (Zofran) 8 MG tablet     Sig: Take 1 tablet by mouth Every 8 (Eight) Hours As Needed for Nausea or Vomiting.     Dispense:  30 tablet     Refill:  0              Follow Up     Return if symptoms worsen or fail to improve.    Patient was given instructions and counseling regarding his condition or for health maintenance advice. Please see specific information pulled into the AVS if appropriate.

## 2024-05-28 DIAGNOSIS — K21.9 GASTROESOPHAGEAL REFLUX DISEASE, UNSPECIFIED WHETHER ESOPHAGITIS PRESENT: ICD-10-CM

## 2024-05-28 RX ORDER — OMEPRAZOLE 40 MG/1
40 CAPSULE, DELAYED RELEASE ORAL DAILY
Qty: 90 CAPSULE | OUTPATIENT
Start: 2024-05-28

## 2024-06-06 ENCOUNTER — HOSPITAL ENCOUNTER (OUTPATIENT)
Dept: PHYSICAL THERAPY | Facility: HOSPITAL | Age: 24
Setting detail: THERAPIES SERIES
Discharge: HOME | End: 2024-06-06
Payer: COMMERCIAL

## 2024-06-06 DIAGNOSIS — M51.46 SCHMORL'S NODES OF LUMBAR REGION: ICD-10-CM

## 2024-06-06 DIAGNOSIS — G89.29 CHRONIC LOW BACK PAIN, UNSPECIFIED BACK PAIN LATERALITY, UNSPECIFIED WHETHER SCIATICA PRESENT: Primary | ICD-10-CM

## 2024-06-06 DIAGNOSIS — M51.26 LUMBAR DISC HERNIATION: ICD-10-CM

## 2024-06-06 DIAGNOSIS — M54.50 CHRONIC LOW BACK PAIN, UNSPECIFIED BACK PAIN LATERALITY, UNSPECIFIED WHETHER SCIATICA PRESENT: Primary | ICD-10-CM

## 2024-06-06 PROCEDURE — 97162 PT EVAL MOD COMPLEX 30 MIN: CPT

## 2024-06-06 NOTE — THERAPY EVALUATION
Outpatient Physical Therapy Ortho Initial Evaluation  [facility]     Patient Name: Hemal Al  : 2000  MRN: [id number]  Today's Date: 2024      Visit Date: 2024    Patient Active Problem List   Diagnosis    Asthma        Past Medical History:   Diagnosis Date    Asthma     Sleep apnea         No past surgical history on file.    Visit Dx:     ICD-10-CM ICD-9-CM   1. Chronic low back pain, unspecified back pain laterality, unspecified whether sciatica present  M54.50 724.2    G89.29 338.29   2. Lumbar disc herniation  M51.26 722.10   3. Schmorl's nodes of lumbar region  M51.46 722.32          Patient History       Row Name 24 1500             History    Chief Complaint Pain  -DR      Type of Pain Back pain  -DR      Date Current Problem(s) Began --  chronic  -DR      Brief Description of Current Complaint Hemal Al is a 23 y.o. male who presents to physical therapy today with primary compliant of chronic low back pain that began about 6-7 years ago. No previous tx prior for LBP. Hemal Al reports difficulty/increased pain with standing, lifting, bending. Pain relieving factors include 90/90 position, stretching, laying flat. Denies sleep disturbances and N/T. Has had x-ray imaging showing multiple Schmorl's nodes throughout the lumbar spine. 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 PMH includes asthma. Hemal Al primary goal for attending skilled physical therapy is to relieve pain.  -      Previous treatment for THIS PROBLEM Other (comment)  none  -DR      Patient/Caregiver Goals Relieve pain;Return to prior level of function;Improve mobility;Improve strength;Know what to do to help the symptoms  -      Hand Dominance right-handed  -      Occupation/sports/leisure activities - lifting heavy metal  -      What clinical tests have you had  "for this problem? X-ray  -DR      Results of Clinical Tests AP, lateral, and bilateral oblique projection of the  lumbar spine demonstrate multiple Schmorl's nodes throughout the lumbar  spine. Otherwise, there is maintenance of vertebral body height. There  is no evidence for a pars defect. No other significant abnormality is  appreciated.  -DR         Pain     Pain Location Knee  -DR      Pain at Present 4  -DR      Pain at Best 0  -DR      Pain at Worst 9  -DR      Pain Frequency Several days a week;Intermittent  -DR      Pain Description Stabbing  \"pinchy, twisting\"  -DR      What Performance Factors Make the Current Problem(s) WORSE? standing, lifting, bending  -DR      What Performance Factors Make the Current Problem(s) BETTER? 90/90 position, stretching, laying flat  -DR      Is your sleep disturbed? No  -DR      Difficulties at work? very physical job, yes  -DR         Fall Risk Assessment    Any falls in the past year: Yes  -DR      Number of falls reported in the last 12 months 1  -DR         Services    Prior Rehab/Home Health Experiences No  -DR         Daily Activities    Primary Language English  -DR      How does patient learn best? Demonstration;Pictures/Video;Listening;Reading  -DR      Barriers to learning None  -DR      Pt Participated in POC and Goals Yes  -DR         Safety    Are you being hurt, hit, or frightened by anyone at home or in your life? No  -DR      Are you being neglected by a caregiver No  -DR      Have you had any of the following issues with N/A  -DR                User Key  (r) = Recorded By, (t) = Taken By, (c) = Cosigned By      Initials Name Provider Type    Eugenio Umaña, PT Physical Therapist                     PT Ortho       Row Name 06/06/24 1500       Posture/Observations    Alignment Options Forward head;Rounded shoulders;Lumbar lordosis  -DR    Forward Head Mild  -DR    Rounded Shoulders Moderate  -DR    Lumbar lordosis Decreased  -DR       Quarter Clearing    " Quarter Clearing Lower Quarter Clearing  -DR       Neural Tension Signs- Lower Quarter Clearing    SLR Bilateral:;Negative  -DR       Myotomal Screen- Lower Quarter Clearing    Hip flexion (L2) Bilateral:;5 (Normal)  -DR    Knee extension (L3) Bilateral:;5 (Normal)  -DR    Ankle DF (L4) Bilateral:;5 (Normal)  -DR    Knee flexion (S2) Bilateral:;5 (Normal)  -DR       Lumbar ROM Screen- Lower Quarter Clearing    Lumbar Flexion Normal  -DR    Lumbar Extension Impaired  75% WNL pain 5/10  -DR    Lumbar Lateral Flexion Impaired  normal ROM, pain to the R  -DR    Lumbar Rotation Normal  -DR       Special Tests/Palpation    Special Tests/Palpation Lumbar/SI  -DR       Lumbosacral Palpation    Lumbosacral Palpation? Yes  -DR    Piriformis Bilateral:;Guarded/taut  -DR    Quadratus Lumborum Left:;Tender;Guarded/taut  -DR    Erector Spinae (Paraspinals) Left:;Tender;Guarded/taut  -DR       MMT (Manual Muscle Testing)    Rt Lower Ext Rt Hip ABduction  -DR    Lt Lower Ext Lt Hip ABduction  -DR       MMT Right Lower Ext    Rt Hip ABduction MMT, Gross Movement (4/5) good  -DR       MMT Left Lower Ext    Lt Hip ABduction MMT, Gross Movement (4/5) good  -DR       Sensation    Sensation WNL? WNL  B LE  -DR       Flexibility    Flexibility Tested? Lower Extremity  -DR       Lower Extremity Flexibility    Hamstrings Bilateral:;Mildly limited  -DR    Hip Flexors Bilateral:;Mildly limited  -DR    Hip External Rotators Bilateral:;WNL  -DR    Hip Internal Rotators Bilateral:;Mildly limited  -DR              User Key  (r) = Recorded By, (t) = Taken By, (c) = Cosigned By      Initials Name Provider Type    Eugenio Umaña, PT Physical Therapist                                Therapy Education  Education Details: Educated on PT role and POC; discussed expectations/timeframe for healing. Postural awareness and verbally discussed lifting mechanics for work. Will review in upcoming tx.  Given: HEP, Symptoms/condition management, Pain  management, Posture/body mechanics, Fall prevention and home safety, Mobility training  Program: New  How Provided: Verbal, Demonstration  Provided to: Patient  Level of Understanding: Teach back education performed, Verbalized, Demonstrated      PT OP Goals       Row Name 06/06/24 1500          PT Short Term Goals    STG Date to Achieve 07/06/24  -     STG 1 Pt. will be independent with initial HEP to improve self-management of condition.  -     STG 1 Progress New  -     STG 2 Pt will demonstrate appropriate lifting, bending, and pivoting mechanics to improve job participation.  -     STG 2 Progress New  -     STG 3 Pt will improve B hip abduction to 4+/5.  -     STG 3 Progress New  -     STG 4 --  -     STG 4 Progress --  -     STG 5 --  -     STG 5 Progress --  -DR        Long Term Goals    LTG Date to Achieve 08/05/24  -     LTG 1 Pt. will be independent with advanced HEP to improve long term independence with self management of condition.  -     LTG 1 Progress New  -     LTG 2 Pt. will score </= 6/50 on Modified Oswestry to indicate improved perception of disability.  -     LTG 2 Progress New  -     LTG 3 Pt. will demonstrate proper TrA engagement in standing and dynamic movements to improve lumbopelvic stability.  -     LTG 3 Progress New  -     LTG 4 Pt will report </= 2/10 pain at worst during a bout of physical job tasks to demonstrate improved body mechanics during work.  -     LTG 4 Progress New  -     LTG 5 --  -     LTG 5 Progress --  -        Time Calculation    PT Goal Re-Cert Due Date 09/04/24  -               User Key  (r) = Recorded By, (t) = Taken By, (c) = Cosigned By      Initials Name Provider Type    Eugenio Umaña, PT Physical Therapist                     PT Assessment/Plan       Row Name 06/06/24 1500          PT Assessment    Functional Limitations Limitations in community activities;Performance in leisure activities;Decreased safety during  functional activities;Performance in work activities  -     Impairments Impaired flexibility;Posture;Poor body mechanics;Pain;Muscle strength;Locomotion;Range of motion;Joint mobility;Joint integrity  -     Assessment Comments Hemal Al is a 23 y.o. year-old male referred to physical therapy for low back pain. He presents with a evolving clinical presentation. He has comorbidities of asthma and personal factors of imaging (Schmorl's nodes of lumbar region), physical job requirements, and increased BMI that may affect his progress in the plan of care. Self scored disability measure of LUPE was a 19/50, where 50 is fully disabled. He demonstrated impaired seated and standing posture with decreased lumbar lordosis, negative SLR test bilaterally, WNL myotome testing in BLE, impaired lumbar AROM lateral flexion to the R (with pain) and extension, tender and guarded QL and ES in lumbar region, deficits in hip abduction and core strength during MMT, and mild limitations in B LE flexibility. Denies sleep disturbances and N/T. Has had x-ray imaging showing multiple Schmorl's nodes throughout the lumbar spine. 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 Signs and symptoms are consistent with referring diagnosis. He is appropriate for skilled therapy services at this time to address deficits and improve ease with job requirements.  -DR     Please refer to paper survey for additional self-reported information No  -DR     Rehab Potential Good  -DR     Patient/caregiver participated in establishment of treatment plan and goals Yes  -DR     Patient would benefit from skilled therapy intervention Yes  -DR        PT Plan    PT Frequency 2x/week  -     Predicted Duration of Therapy Intervention (PT) 12 visits  -     Planned CPT's? PT EVAL LOW COMPLEXITY: 11148;PT RE-EVAL: 63061;PT THER PROC EA 15 MIN:  46714;PT THER ACT EA 15 MIN: 73151;PT MANUAL THERAPY EA 15 MIN: 10804;PT NEUROMUSC RE-EDUCATION EA 15 MIN: 76222;PT GAIT TRAINING EA 15 MIN: 62697;PT SELF CARE/HOME MGMT/TRAIN EA 15: 93584;PT HOT OR COLD PACK TREAT [facility];PT ELECTRICAL STIM UNATTEND: ;PT ELECTRICAL STIM ATTD EA 15 MIN: 76175;PT ULTRASOUND EA 15 MIN: 58752;PT TRACTION LUMBAR: 01306  -DR     PT Plan Comments initate HEP this visit (unable for eval d/t 15 min late arrival time)- main focus on hip girdle and core strengthening. future visits addressing body mechanics for very physically demanding job. consider adding PPT, bridge with tilt, s/l clamshells, SL STS, AR press, bird dogs, iso dead bug hold. may benefit from STM to L ES.  -               User Key  (r) = Recorded By, (t) = Taken By, (c) = Cosigned By      Initials Name Provider Type    Eugenio Umaña, PT Physical Therapist                       OP Exercises       Row Name 06/06/24 1600             Subjective Pain    Subjective Pain Comment arrives 15 min late to eval  -         Exercise 1    Exercise Name 1 RCB w/u  -DR      Additional Comments next visit  -                User Key  (r) = Recorded By, (t) = Taken By, (c) = Cosigned By      Initials Name Provider Type    Eugenio Umaña, PT Physical Therapist                                  Outcome Measure Options: Modified Oswestry  Modified Oswestry  Modified Oswestry Score/Comments: 19/5= 38%      Time Calculation:     Start Time: 1518 (15 min late to eval)  Stop Time: 1547  Time Calculation (min): 29 min     Therapy Charges for Today       Code Description Service Date Service Provider Modifiers Qty    [id number]  PT EVAL MOD COMPLEXITY 2 6/6/2024 Eugenio Rock, PT GP 1            PT G-Codes  Outcome Measure Options: Modified Oswestry  Modified Oswestry Score/Comments: 19/5= 38%         Eugenio Rock, PT  6/6/2024

## 2024-06-27 ENCOUNTER — APPOINTMENT (OUTPATIENT)
Dept: PHYSICAL THERAPY | Facility: HOSPITAL | Age: 24
End: 2024-06-27
Payer: COMMERCIAL

## 2024-07-12 ENCOUNTER — TELEPHONE (OUTPATIENT)
Dept: PHYSICAL THERAPY | Facility: HOSPITAL | Age: 24
End: 2024-07-12
Payer: COMMERCIAL

## 2024-07-12 NOTE — TELEPHONE ENCOUNTER
Left voicemail regarding no show for today's appointment. Informed patient this was his second no show. Reminded pt of next appointment date and time, as well as reminded of no show/24 hour cancellation policy. Informed patient if he is to no show or late same day cx his next appt, then all remaining appts will be removed from the schedule. Provided call back number and requested pt call back if unable to make next appointment.

## 2024-07-16 ENCOUNTER — TELEPHONE (OUTPATIENT)
Dept: PHYSICAL THERAPY | Facility: HOSPITAL | Age: 24
End: 2024-07-16
Payer: COMMERCIAL

## 2024-07-16 NOTE — TELEPHONE ENCOUNTER
Spoke with patient regarding no show for today's appointment. Informed pt this was his third no show and per our no show/24 hour cancellation policy, all of his remaining visits will be removed from the schedule. Patient verbalized understanding.